# Patient Record
(demographics unavailable — no encounter records)

---

## 2024-12-24 NOTE — HISTORY OF PRESENT ILLNESS
[9] : 9 [8] : 8 [Radiating] : radiating [Shooting] : shooting [Constant] : constant [Full time] : Work status: full time [de-identified] : 47 y/o RHD F with R elbow pain 2 months ago after fall. Elbow pain continues. She has not tried any interventions for pain. denies numbness/tingling. [] : Post Surgical Visit: no [FreeTextEntry1] : Right elbow [FreeTextEntry5] : Pt reports she fell in [facility] [place] and landed on her right elbow a few weeks ago. no prior hx [de-identified] : activity

## 2024-12-24 NOTE — IMAGING
[de-identified] : PE R elbow: +swelling, +tenderness over UCL ligament, no tenderness laterally, FROM, full supination and pronation, no opening with varus/valgus stress, NVI distally [Right] : right elbow [There are no fractures, subluxations or dislocations. No significant abnormalities are seen] : There are no fractures, subluxations or dislocations. No significant abnormalities are seen

## 2024-12-24 NOTE — ASSESSMENT
[FreeTextEntry1] : A/P Possible R UCL tear  - MRI - rest - ice/elevation - sling - f/u sports medicine

## 2025-01-21 NOTE — HISTORY OF PRESENT ILLNESS
[de-identified] : 01/15/2025 KETAN ORNELAS is a 46 year old female here today for: Location: Right elbow Complaint: The patient presents to the office today for evaluation of right medial sided elbow pain.  She notes the insidious onset of pain in the medial epicondyle, worse with certain activities including heavy lifting.  She does recall a fall onto her right elbow roughly 2 months ago prior to the onset of the symptoms.  Additionally, since this injury the patient reports cramping and paresthesias in the right hand.  She has an MRI available for review today. Symptom onset: 2 months ago Prior treatments: None Hand Dominance: Right

## 2025-01-21 NOTE — DISCUSSION/SUMMARY
[de-identified] : - discussed the etiology/ pathophysiology of each of the patient's complaints today in layman's terms - reviewed treatment options, conservative and operative as well as the indications for each - discussed conservative treatment options including the role for anti-inflammatory medications, injections, bracing and therapy - reviewed operative treatment for medial epicondylitis and postoperative expectations - reviewed risks, benefits and alternatives to these - activity modifications reviewed - AAOS medial epicondylitis exercises provided - NCS/EMG to evaluate her paresthesias - NSAIDs prn pain, Mobic Rx sent, patient cautioned on side effect profile - f/u after EMG   My cumulative time spent on this patient's visit included: Preparation for the visit, review of the medical records, review of pertinent diagnostic studies, examination and counseling of the patient on the above diagnosis, treatment plan and prognosis, orders of diagnostic tests, medications and/or appropriate procedures and documentation in the medical records of today's visit.

## 2025-01-21 NOTE — DISCUSSION/SUMMARY
[de-identified] : - discussed the etiology/ pathophysiology of each of the patient's complaints today in layman's terms - reviewed treatment options, conservative and operative as well as the indications for each - discussed conservative treatment options including the role for anti-inflammatory medications, injections, bracing and therapy - reviewed operative treatment for medial epicondylitis and postoperative expectations - reviewed risks, benefits and alternatives to these - activity modifications reviewed - AAOS medial epicondylitis exercises provided - NCS/EMG to evaluate her paresthesias - NSAIDs prn pain, Mobic Rx sent, patient cautioned on side effect profile - f/u after EMG   My cumulative time spent on this patient's visit included: Preparation for the visit, review of the medical records, review of pertinent diagnostic studies, examination and counseling of the patient on the above diagnosis, treatment plan and prognosis, orders of diagnostic tests, medications and/or appropriate procedures and documentation in the medical records of today's visit.

## 2025-01-21 NOTE — HISTORY OF PRESENT ILLNESS
[de-identified] : 01/15/2025 KETAN ORNELAS is a 46 year old female here today for: Location: Right elbow Complaint: The patient presents to the office today for evaluation of right medial sided elbow pain.  She notes the insidious onset of pain in the medial epicondyle, worse with certain activities including heavy lifting.  She does recall a fall onto her right elbow roughly 2 months ago prior to the onset of the symptoms.  Additionally, since this injury the patient reports cramping and paresthesias in the right hand.  She has an MRI available for review today. Symptom onset: 2 months ago Prior treatments: None Hand Dominance: Right

## 2025-01-21 NOTE — IMAGING
[de-identified] : Right elbow No ecchymosis, swelling or wounds Normal muscle tone/ bulk TTP at the medial epicondyle Full symmetric elbow/ROM Pain reproduced in the medial epicondyle with resisted wrist flexion Able to make a painless composite fist +AIN/ PIN/ Ulnar n SILT throughout fingers wwp + Phalens + Elbow hyperflexion test  3 views of the right elbow are available for review and personally interpreted: No acute fractures or malalignment, no cortical irregularities MRI right elbow (12/28/24): No visualized ligament tear.  Mild tendinopathy of the common flexor tendon origin.  Appearance of the ulnar nerve at the level of the cubital tunnel which could reflect mild neuritis.

## 2025-01-21 NOTE — HISTORY OF PRESENT ILLNESS
[de-identified] : 01/15/2025 KETAN ORNELAS is a 46 year old female here today for: Location: Right elbow Complaint: The patient presents to the office today for evaluation of right medial sided elbow pain.  She notes the insidious onset of pain in the medial epicondyle, worse with certain activities including heavy lifting.  She does recall a fall onto her right elbow roughly 2 months ago prior to the onset of the symptoms.  Additionally, since this injury the patient reports cramping and paresthesias in the right hand.  She has an MRI available for review today. Symptom onset: 2 months ago Prior treatments: None Hand Dominance: Right

## 2025-01-21 NOTE — IMAGING
[de-identified] : Right elbow No ecchymosis, swelling or wounds Normal muscle tone/ bulk TTP at the medial epicondyle Full symmetric elbow/ROM Pain reproduced in the medial epicondyle with resisted wrist flexion Able to make a painless composite fist +AIN/ PIN/ Ulnar n SILT throughout fingers wwp + Phalens + Elbow hyperflexion test  3 views of the right elbow are available for review and personally interpreted: No acute fractures or malalignment, no cortical irregularities MRI right elbow (12/28/24): No visualized ligament tear.  Mild tendinopathy of the common flexor tendon origin.  Appearance of the ulnar nerve at the level of the cubital tunnel which could reflect mild neuritis.

## 2025-01-21 NOTE — DISCUSSION/SUMMARY
[de-identified] : - discussed the etiology/ pathophysiology of each of the patient's complaints today in layman's terms - reviewed treatment options, conservative and operative as well as the indications for each - discussed conservative treatment options including the role for anti-inflammatory medications, injections, bracing and therapy - reviewed operative treatment for medial epicondylitis and postoperative expectations - reviewed risks, benefits and alternatives to these - activity modifications reviewed - AAOS medial epicondylitis exercises provided - NCS/EMG to evaluate her paresthesias - NSAIDs prn pain, Mobic Rx sent, patient cautioned on side effect profile - f/u after EMG   My cumulative time spent on this patient's visit included: Preparation for the visit, review of the medical records, review of pertinent diagnostic studies, examination and counseling of the patient on the above diagnosis, treatment plan and prognosis, orders of diagnostic tests, medications and/or appropriate procedures and documentation in the medical records of today's visit.